# Patient Record
(demographics unavailable — no encounter records)

---

## 2025-02-04 NOTE — ADDENDUM
[FreeTextEntry1] : This note was written by Gregoria Orellana on 02/04/2025 acting as medical scribe for Dr. Juan Colon. I, Dr. Juan Colon, have read and attest that all the information, medical decision making and discharge instructions within are true and accurate.

## 2025-02-04 NOTE — HISTORY OF PRESENT ILLNESS
[FreeTextEntry1] : Mr. ZOEY RODRIGUEZ is a 72-year-old male who returns with regard to a history of hypothyroidism. Too, hx of underlying Hashimoto's disease.   Additional medical history includes hypertension, hyperlipidemia vitamin d deficiency.  He is taking Levothyroxine 75 mcg daily. Prior was taking 75 mcg alt with 50 mcg.  He has been compliant in taking the LT4 daily, away from food or any medication that may inhibit absorption. He has tolerated this medication well without any apparent adverse effects. He denies any temperature intolerance, significant weight changes, or severe fatigue. He in addition denies any palpitations, tremors, anxiousness, change in bowel habits or significant change in moods.  Last TFTs stable wnl in Jan 2024 with TSH at 2.82, FT4 at 1.4, and FT3 at 3.41. ____________________________________________________________________________________________________ Taking Metoprolol 25 mg, Simvastatin 40 mg, Ecotrin 81 mg, pantoprazole, Vitamin D3 2,000 IU two daily, Zinc 50 mg, Vitamin C 500 mg, zinc, Antihistamine pill daily for allergies.  Cardiologist: Dr. Pettit